# Patient Record
Sex: FEMALE | Race: WHITE | NOT HISPANIC OR LATINO | Employment: FULL TIME | URBAN - METROPOLITAN AREA
[De-identification: names, ages, dates, MRNs, and addresses within clinical notes are randomized per-mention and may not be internally consistent; named-entity substitution may affect disease eponyms.]

---

## 2017-04-27 ENCOUNTER — TRANSFERRED RECORDS (OUTPATIENT)
Dept: HEALTH INFORMATION MANAGEMENT | Facility: CLINIC | Age: 32
End: 2017-04-27

## 2017-04-28 ENCOUNTER — TRANSFERRED RECORDS (OUTPATIENT)
Dept: HEALTH INFORMATION MANAGEMENT | Facility: CLINIC | Age: 32
End: 2017-04-28

## 2017-05-22 ENCOUNTER — PRE VISIT (OUTPATIENT)
Dept: OTOLARYNGOLOGY | Facility: CLINIC | Age: 32
End: 2017-05-22

## 2017-05-22 NOTE — TELEPHONE ENCOUNTER
1.  Date/reason for appt:5/25/17, Ear pain  2.  Referring provider: Self  3.  Call to patient (Yes / No - short description): No, transferred from call center  4.  Previous care at / records requested from:   [facility], [place]   [facility]

## 2017-05-24 DIAGNOSIS — H69.90 DYSFUNCTION OF EUSTACHIAN TUBE, UNSPECIFIED LATERALITY: Primary | ICD-10-CM

## 2017-05-24 NOTE — TELEPHONE ENCOUNTER
Records received from [facility].   Included  Office notes: 4/19/17, 4/26/17, 5/2/17, 5/15/17  ED notes: 4/27/17    Missing/needed records: Dr Goldstein of (images, myringotomy)

## 2017-05-24 NOTE — TELEPHONE ENCOUNTER
Records received from [facility].   Included  Office notes: 4/28/17, 5/9/17, 5/17/17, 5/16/17  Radiology reports: CT ear on 5/9/17    Missing/needed records: op-note on 5/19/17

## 2017-05-25 ENCOUNTER — OFFICE VISIT (OUTPATIENT)
Dept: OTOLARYNGOLOGY | Facility: CLINIC | Age: 32
End: 2017-05-25

## 2017-05-25 ENCOUNTER — OFFICE VISIT (OUTPATIENT)
Dept: AUDIOLOGY | Facility: CLINIC | Age: 32
End: 2017-05-25

## 2017-05-25 DIAGNOSIS — H90.6 MIXED CONDUCTIVE AND SENSORINEURAL HEARING LOSS OF BOTH EARS: Primary | ICD-10-CM

## 2017-05-25 DIAGNOSIS — H69.93 DYSFUNCTION OF EUSTACHIAN TUBE, BILATERAL: ICD-10-CM

## 2017-05-25 DIAGNOSIS — H90.0 CONDUCTIVE HEARING LOSS, BILATERAL: Primary | ICD-10-CM

## 2017-05-25 DIAGNOSIS — M26.609 TMJ (TEMPOROMANDIBULAR JOINT SYNDROME): ICD-10-CM

## 2017-05-25 ASSESSMENT — PAIN SCALES - GENERAL: PAINLEVEL: MILD PAIN (2)

## 2017-05-25 NOTE — PROGRESS NOTES
The patient presents with a history of eustachian tube dysfunction and otalgia. She reports that the difficulty began approximately two months ago with pressure and pain in the ears. The patient was treated for allergies and eustachian tube dysfunction with a serous effusion in the left ear. The patient reports no improvement after a PE tube was placed in the left ear. Her CT scan from [place] is reviewed with her. The patient denies sinusitis, facial pain, nasal obstruction or purulent nasal discharge. She does reports rhinitis related to allergies. She had two previous epistaxis events. The patient denies chronic or recurrent tonsillitis, chronic or recurrent pharyngitis. The patient denies otorrhea, dizziness or tinnitus. Her Audiogram and Tympanogram are reviewed with her and they demonstrate bilateral mild conductive hearing loss worse at the lower frequencies with sensorineural hearing loss in the left ear at higher frequencies. The word recognition scores are 100% on the right and 96% on the left. The left tympanogram is flat consistent with a PE tube and there is mild negative pressure in the right ear.     This patient is seen in consultation as a self referral to my clinic.    All other systems were reviewed and they are either negative or they are not directly pertinent to this Otolaryngology examination.      Past Medical History:    Past Medical History:   Diagnosis Date     Conductive hearing loss 4/23/17     Tinnitus 5/15/17?l       Past Surgical History:    Past Surgical History:   Procedure Laterality Date     PE TUBES  5/19/17    only left ear     TONSILLECTOMY  1988?       Medications:    No current outpatient prescriptions on file.    Allergies:    Erythromycin    Physical Examination:    The patient is a well developed, well nourished female in no apparent distress.  She is normocephalic, atraumatic with pupils equally round and reactive to light.    Oral Cavity Examination:  Normal mucosa with  no masses or lesions  Nasal Examination: Normal mucosa with no masses or lesions  Ear Examination: Ear canals clear, tympanic membranes are sclerotic and middle ear spaces are free of effusions or infection. There is a patent PE tube in the left ear.  Neurological Examination: Facial nerve function intact and symmetric  Integumentary Examination: No lesions on the skin of the head and neck  Neck Examination: No masses or lesions, no lymphadenopathy  Endocrine Examination: Normal thyroid examination  Temporomandibular Joint Examination: Malrotation of the temporomandibular joints bilaterally.  Flexible Fiberoptic Laryngoscopy:  Normal nasopharynx, base of tongue, pyriform sinuses, epiglottis, valleculae, false vocal cords, true vocal cords, and larynx.  Normal motion of the vocal cords with no lesions, masses, nodules, or polyps bilaterally.      Assessment and Plan:    The patient presents with a history of pain in the ears and eustachian tube dysfunction and allergic rhinitis. She will be referred for a repeat CT scan now that the PE tube has been placed to further assess her middle and mastoid. Her otalgia will be further evaluated with the temporomandibular joint disorder clinic of the dental department as she appears to have otalgia secondary to temporomandibular joint disorder. She will be seen again after this CT scan is completed.     CC: Dr. Cheryl Goldstein

## 2017-05-25 NOTE — PATIENT INSTRUCTIONS
The patient presents with a history of pain in the ears and eustachian tube dysfunction and allergic rhinitis. She will be referred for a repeat CT scan now that the PE tube has been placed to further assess her middle and mastoid. Her otalgia will be further evaluated with the temporomandibular joint disorder clinic of the dental department as she appears to have otalgia secondary to temporomandibular joint disorder. She will be seen again after this CT scan is completed.     Patient schedule at [facility] on June 7, 2017 at 2:40 PM.

## 2017-05-25 NOTE — PROGRESS NOTES
AUDIOLOGY REPORT    SUMMARY: Audiology visit completed. See audiogram for results.      RECOMMENDATIONS: Follow-up with ENT.    Yessica Beyer.  Licensed Audiologist  [place] #[id number]

## 2017-05-25 NOTE — MR AVS SNAPSHOT
After Visit Summary   2017    Jerardo Diamond    MRN: [id number]           Patient Information     Date Of Birth          1985        Visit Information        Provider Department      2017 7:30 AM Stacie Parsons AuD M [facility]        Today's Diagnoses     Conductive hearing loss, bilateral    -  1       Follow-ups after your visit        Who to contact     Please call your clinic at [phone] to:    Ask questions about your health    Make or cancel appointments    Discuss your medicines    Learn about your test results    Speak to your doctor   If you have compliments or concerns about an experience at your clinic, or if you wish to file a complaint, please contact [facility] Patient Relations at [phone] or email us at [email or web address]         Additional Information About Your Visit        MyChart Information     Mobileum is an electronic gateway that provides easy, online access to your medical records. With Mobileum, you can request a clinic appointment, read your test results, renew a prescription or communicate with your care team.     To sign up for [email or web address] visit the website at [email or web address]   You will be asked to enter the access code listed below, as well as some personal information. Please follow the directions to create your username and password.     Your access code is: [email or web address]  Expires: 2017  6:30 AM     Your access code will  in 90 days. If you need help or a new code, please contact your [facility] Clinic or call [phone] for assistance.        Care EveryWhere ID     This is your Care EveryWhere ID. This could be used by other organizations to access your [place] medical records  [id number]         Blood Pressure from Last 3 Encounters:   No data found for BP    Weight from Last 3 Encounters:   No data found for Wt              We Performed the  Following     AUDIOGRAM/TYMPANOGRAM - INTERFACE     [facility] Thr[facility]ld Eval & Speech Recog (12382)     Reduced 52 - Tymps / Reflex   (48878)        Primary Care Provider Office Phone # Fax #    Jennifer CASTAÑEDA Zaydagiselle [phone]       [facility] [address]  [facility] [address]        Thank you!     Thank you for choosing [facility]  for your care. Our goal is always to provide you with excellent care. Hearing back from our patients is one way we can continue to improve our services. Please take a few minutes to complete the written survey that you may receive in the mail after your visit with us. Thank you!             Your Updated Medication List - Protect others around you: Learn how to safely use, store and throw away your medicines at www.disposemymeds.org.      Notice  As of 5/25/2017  8:26 AM    You have not been prescribed any medications.

## 2017-05-25 NOTE — MR AVS SNAPSHOT
After Visit Summary   5/25/2017    Jerardo Diamond    MRN: [id number]           Patient Information     Date Of Birth          1985        Visit Information        Provider Department      5/25/2017 8:00 AM Tex Dangelo MD [facility] Ear Nose and Thro[facility]        Today's Diagnoses     Mixed conductive and sensorineural hearing loss of both ears    -  1    TMJ (temporomandibular joint syndrome)        Dysfunction of eustachian tube, bilateral          Care Instructions    The patient presents with a history of pain in the ears and eustachian tube dysfunction and allergic rhinitis. She will be referred for a repeat CT scan now that the PE tube has been placed to further assess her middle and mastoid. Her otalgia will be further evaluated with the temporomandibular joint disorder clinic of the dental department as she appears to have otalgia secondary to temporomandibular joint disorder. She will be seen again after this CT scan is completed.     Called the phone number back of the [employer] card the find out the in-network for TMJ clinic.  Call Jose to fax the referral .           Follow-ups after your visit        Additional Services     DENTAL REFERRAL       Consult for temporomandibular joint disorder evaluation                  Your next 10 appointments already scheduled     May 30, 2017  7:20 AM CDT   CT TEMPORAL ORBITAL SELLA W/O CONTRAST with UCCT1   [facility] CT ([facility])    [address] [address] [phone]           Please bring any scans or X-rays taken at other hospitals, if similar tests were done. Also bring a list of your medicines, including vitamins, minerals and over-the-counter drugs. It is safest to leave personal items at home.  Be sure to tell your doctor:   If you have any allergies.   If there s any chance you are pregnant.   If you are breastfeeding.   If you have any  special needs.  You do not need to do anything special to prepare.  Please wear loose clothing, such as a sweat suit or jogging clothes. Avoid snaps, zippers and other metal. We may ask you to undress and put on a hospital gown.            2017  8:30 AM CDT   (Arrive by 8:15 AM)   Return Visit with Tex Dangelo MD   [facility] Ear Nose and Throat ([facility])    [address] [facility]  [address] [facility] [address] [phone]              Future tests that were ordered for you today     Open Future Orders        Priority Expected Expires Ordered    CT Temporal orbital sella w/o contrast Routine  2018            Who to contact     Please call your clinic at [phone] to:    Ask questions about your health    Make or cancel appointments    Discuss your medicines    Learn about your test results    Speak to your doctor   If you have compliments or concerns about an experience at your clinic, or if you wish to file a complaint, please contact [facility] Patient Relations at [phone] or email us at [email or web address]         Additional Information About Your Visit        Black Duck Softwarehart Information     SchoolMintt is an electronic gateway that provides easy, online access to your medical records. With GEOLID, you can request a clinic appointment, read your test results, renew a prescription or communicate with your care team.     To sign up for SchoolMintt visit the website at [email or web address]   You will be asked to enter the access code listed below, as well as some personal information. Please follow the directions to create your username and password.     Your access code is: [email or web address]  Expires: 2017  6:30 AM     Your access code will  in 90 days. If you need help or a new code, please contact your [facility] Clinic or call [phone] for assistance.        Care  EveryWhere ID     This is your Care EveryWhere ID. This could be used by other organizations to access your [place] medical records  [id number]         Blood Pressure from Last 3 Encounters:   No data found for BP    Weight from Last 3 Encounters:   No data found for Wt              We Performed the Following     DENTAL REFERRAL     LARYNGOSCOPY FLEX FIBEROPTIC, DIAGNOSTIC        Primary Care Provider Office Phone # Fax #    Jennifer Jones [phone]       [place] [address] [address] [facility] [address]        Thank you!     Thank you for choosing [facility] EAR NOSE AND THROAT  for your care. Our goal is always to provide you with excellent care. Hearing back from our patients is one way we can continue to improve our services. Please take a few minutes to complete the written survey that you may receive in the mail after your visit with us. Thank you!             Your Updated Medication List - Protect others around you: Learn how to safely use, store and throw away your medicines at www.disposemymeds.org.      Notice  As of 5/25/2017  9:38 AM    You have not been prescribed any medications.

## 2017-05-25 NOTE — LETTER
5/25/2017       RE: Jerardo Diamond  [address] [address]     Dear Colleague,    Thank you for referring your patient, Jerardo Diamond, to the [facility] EAR NOSE AND THROAT at [facility]. Please see a copy of my visit note below.    The patient presents with a history of eustachian tube dysfunction and otalgia. She reports that the difficulty began approximately two months ago with pressure and pain in the ears. The patient was treated for allergies and eustachian tube dysfunction with a serous effusion in the left ear. The patient reports no improvement after a PE tube was placed in the left ear. Her CT scan from [place] is reviewed with her. The patient denies sinusitis, facial pain, nasal obstruction or purulent nasal discharge. She does reports rhinitis related to allergies. She had two previous epistaxis events. The patient denies chronic or recurrent tonsillitis, chronic or recurrent pharyngitis. The patient denies otorrhea, dizziness or tinnitus. Her Audiogram and Tympanogram are reviewed with her and they demonstrate bilateral mild conductive hearing loss worse at the lower frequencies with sensorineural hearing loss in the left ear at higher frequencies. The word recognition scores are 100% on the right and 96% on the left. The left tympanogram is flat consistent with a PE tube and there is mild negative pressure in the right ear.     This patient is seen in consultation as a self referral to my clinic.    All other systems were reviewed and they are either negative or they are not directly pertinent to this Otolaryngology examination.      Past Medical History:    Past Medical History:   Diagnosis Date     Conductive hearing loss 4/23/17     Tinnitus 5/15/17?l       Past Surgical History:    Past Surgical History:   Procedure Laterality Date     PE TUBES  5/19/17    only left ear     TONSILLECTOMY  1988?       Medications:    No current outpatient  prescriptions on file.    Allergies:    Erythromycin    Physical Examination:    The patient is a well developed, well nourished female in no apparent distress.  She is normocephalic, atraumatic with pupils equally round and reactive to light.    Oral Cavity Examination:  Normal mucosa with no masses or lesions  Nasal Examination: Normal mucosa with no masses or lesions  Ear Examination: Ear canals clear, tympanic membranes are sclerotic and middle ear spaces are free of effusions or infection. There is a patent PE tube in the left ear.  Neurological Examination: Facial nerve function intact and symmetric  Integumentary Examination: No lesions on the skin of the head and neck  Neck Examination: No masses or lesions, no lymphadenopathy  Endocrine Examination: Normal thyroid examination  Temporomandibular Joint Examination: Malrotation of the temporomandibular joints bilaterally.  Flexible Fiberoptic Laryngoscopy:  Normal nasopharynx, base of tongue, pyriform sinuses, epiglottis, valleculae, false vocal cords, true vocal cords, and larynx.  Normal motion of the vocal cords with no lesions, masses, nodules, or polyps bilaterally.      Assessment and Plan:    The patient presents with a history of pain in the ears and eustachian tube dysfunction and allergic rhinitis. She will be referred for a repeat CT scan now that the PE tube has been placed to further assess her middle and mastoid. Her otalgia will be further evaluated with the temporomandibular joint disorder clinic of the dental department as she appears to have otalgia secondary to temporomandibular joint disorder. She will be seen again after this CT scan is completed.     CC: Dr. Cheryl Goldstein      Again, thank you for allowing me to participate in the care of your patient.      Sincerely,    Tex Dangelo MD

## 2017-06-01 ENCOUNTER — OFFICE VISIT (OUTPATIENT)
Dept: OTOLARYNGOLOGY | Facility: CLINIC | Age: 32
End: 2017-06-01

## 2017-06-01 DIAGNOSIS — H90.6 MIXED CONDUCTIVE AND SENSORINEURAL HEARING LOSS OF BOTH EARS: Primary | ICD-10-CM

## 2017-06-01 ASSESSMENT — PAIN SCALES - GENERAL: PAINLEVEL: NO PAIN (1)

## 2017-06-01 NOTE — PATIENT INSTRUCTIONS
The patient presents with a history of pain in the ears and eustachian tube dysfunction and allergic rhinitis. Based upon her CT scan, the patient will be referred for a neurotology evaluation. She is scheduled for an allergy evaluation. Her otalgia will be further evaluated with the temporomandibular joint disorder clinic of the dental department as she appears to have otalgia secondary to temporomandibular joint disorder.

## 2017-06-01 NOTE — PROGRESS NOTES
The patient presents with a history of eustachian tube dysfunction and otalgia. She reports that the difficulty began approximately two months ago with pressure and pain in the ears. The patient was treated for allergies and eustachian tube dysfunction with a serous effusion in the left ear. The patient reports no improvement after a PE tube was placed in the left ear. Her CT scan from [place] is reviewed with her and the repeat CT scan is reviewed with her. There is some debris in the epitympanum.  She does reports rhinitis related to allergies. She had two previous epistaxis events.The patient denies otorrhea, dizziness or tinnitus. Her Audiogram and Tympanogram are reviewed with her and they demonstrate bilateral mild conductive hearing loss worse at the lower frequencies with sensorineural hearing loss in the left ear at higher frequencies. The word recognition scores are 100% on the right and 96% on the left. The left tympanogram is flat consistent with a PE tube and there is mild negative pressure in the right ear.       All other systems were reviewed and they are either negative or they are not directly pertinent to this Otolaryngology examination.      Past Medical History:    Past Medical History:   Diagnosis Date     Conductive hearing loss 4/23/17     Tinnitus 5/15/17?l       Past Surgical History:    Past Surgical History:   Procedure Laterality Date     PE TUBES  5/19/17    only left ear     TONSILLECTOMY  1988?       Medications:      Current Outpatient Prescriptions:      Cetirizine HCl (ZYRTEC ALLERGY PO), Take 10 mg by mouth, Disp: , Rfl:      Montelukast Sodium (SINGULAIR PO), , Disp: , Rfl:      Ibuprofen (ADVIL PO), Take 600 mg by mouth, Disp: , Rfl:     Allergies:    Erythromycin    Physical Examination:    The patient is a well developed, well nourished female in no apparent distress.  She is normocephalic, atraumatic with pupils equally round and reactive to light.    Oral Cavity Examination:   Normal mucosa with no masses or lesions  Nasal Examination: Normal mucosa with no masses or lesions  Ear Examination: Ear canals clear, tympanic membranes are sclerotic and middle ear spaces are free of effusions or infection. There is a patent PE tube in the left ear.  Neurological Examination: Facial nerve function intact and symmetric  Integumentary Examination: No lesions on the skin of the head and neck  Temporomandibular Joint Examination: Malrotation of the temporomandibular joints bilaterally.    Assessment and Plan:    The patient presents with a history of pain in the ears and eustachian tube dysfunction and allergic rhinitis. Based upon her CT scan, the patient will be referred for a neurotology evaluation. She is scheduled for an allergy evaluation. Her otalgia will be further evaluated with the temporomandibular joint disorder clinic of the dental department as she appears to have otalgia secondary to temporomandibular joint disorder.    CC: Dr. Cheryl Goldstein

## 2017-06-01 NOTE — LETTER
6/1/2017       RE: Jerardo Diamond  [address] [facility] [address]     Dear Colleague,    Thank you for referring your patient, Jerardo Diamond, to the [facility] EAR NOSE AND THROAT at [facility]. Please see a copy of my visit note below.    The patient presents with a history of eustachian tube dysfunction and otalgia. She reports that the difficulty began approximately two months ago with pressure and pain in the ears. The patient was treated for allergies and eustachian tube dysfunction with a serous effusion in the left ear. The patient reports no improvement after a PE tube was placed in the left ear. Her CT scan from [place] is reviewed with her and the repeat CT scan is reviewed with her. There is some debris in the epitympanum.  She does reports rhinitis related to allergies. She had two previous epistaxis events.The patient denies otorrhea, dizziness or tinnitus. Her Audiogram and Tympanogram are reviewed with her and they demonstrate bilateral mild conductive hearing loss worse at the lower frequencies with sensorineural hearing loss in the left ear at higher frequencies. The word recognition scores are 100% on the right and 96% on the left. The left tympanogram is flat consistent with a PE tube and there is mild negative pressure in the right ear.       All other systems were reviewed and they are either negative or they are not directly pertinent to this Otolaryngology examination.      Past Medical History:    Past Medical History:   Diagnosis Date     Conductive hearing loss 4/23/17     Tinnitus 5/15/17?l       Past Surgical History:    Past Surgical History:   Procedure Laterality Date     PE TUBES  5/19/17    only left ear     TONSILLECTOMY  1988?       Medications:      Current Outpatient Prescriptions:      Cetirizine HCl (ZYRTEC ALLERGY PO), Take 10 mg by mouth, Disp: , Rfl:      Montelukast Sodium (SINGULAIR PO), , Disp: , Rfl:      Ibuprofen (ADVIL PO), Take  600 mg by mouth, Disp: , Rfl:     Allergies:    Erythromycin    Physical Examination:    The patient is a well developed, well nourished female in no apparent distress.  She is normocephalic, atraumatic with pupils equally round and reactive to light.    Oral Cavity Examination:  Normal mucosa with no masses or lesions  Nasal Examination: Normal mucosa with no masses or lesions  Ear Examination: Ear canals clear, tympanic membranes are sclerotic and middle ear spaces are free of effusions or infection. There is a patent PE tube in the left ear.  Neurological Examination: Facial nerve function intact and symmetric  Integumentary Examination: No lesions on the skin of the head and neck  Temporomandibular Joint Examination: Malrotation of the temporomandibular joints bilaterally.    Assessment and Plan:    The patient presents with a history of pain in the ears and eustachian tube dysfunction and allergic rhinitis. Based upon her CT scan, the patient will be referred for a neurotology evaluation. She is scheduled for an allergy evaluation. Her otalgia will be further evaluated with the temporomandibular joint disorder clinic of the dental department as she appears to have otalgia secondary to temporomandibular joint disorder.    CC: Dr. Cheryl Goldstein      Sincerely,    Tex Dangelo MD

## 2017-06-01 NOTE — MR AVS SNAPSHOT
After Visit Summary   6/1/2017    Jerardo Diamond    MRN: [id number]           Patient Information     Date Of Birth          1985        Visit Information        Provider Department      6/1/2017 8:30 AM Tex Dangelo MD [facility] Ear Nose and Throat        Today's Diagnoses     Mixed conductive and sensorineural hearing loss of both ears    -  1      Care Instructions    The patient presents with a history of pain in the ears and eustachian tube dysfunction and allergic rhinitis. Based upon her CT scan, the patient will be referred for a neurotology evaluation. She is scheduled for an allergy evaluation. Her otalgia will be further evaluated with the temporomandibular joint disorder clinic of the dental department as she appears to have otalgia secondary to temporomandibular joint disorder.          Follow-ups after your visit        Your next 10 appointments already scheduled     Jun 07, 2017 12:00 PM CDT   (Arrive by 11:45 AM)   NEW NEUROTOLOGY VISIT with MD MADDY Guzman [facility] Nose and Throat ([facility])    [address] [place] [address] [phone]              Who to contact     Please call your clinic at [phone] to:    Ask questions about your health    Make or cancel appointments    Discuss your medicines    Learn about your test results    Speak to your doctor   If you have compliments or concerns about an experience at your clinic, or if you wish to file a complaint, please contact [facility] Patient Relations at [phone] or email us at [email or web address]         Additional Information About Your Visit        MyChart Information     SOMNIUMÂ® Technologiest gives you secure access to your electronic health record. If you see a primary care provider, you can also send messages to your care team and make appointments. If you have questions, please call your primary care clinic.   If you do not have a primary care provider, please call [phone] and they will assist you.      ManageSocial is an electronic gateway that provides easy, online access to your medical records. With ManageSocial, you can request a clinic appointment, read your test results, renew a prescription or communicate with your care team.     To access your existing account, please contact your [facility] or call [phone] for assistance.        Care EveryWhere ID     This is your Care EveryWhere ID. This could be used by other organizations to access your [place] medical records  [id number]         Blood Pressure from Last 3 Encounters:   No data found for BP    Weight from Last 3 Encounters:   No data found for Wt              Today, you had the following     No orders found for display       Primary Care Provider Office Phone # Fax #    Jennifer MADDY Zaydagiselle [phone]       [place] [address] [address] [facility] [address]        Thank you!     Thank you for choosing [facility] EAR NOSE AND THROAT  for your care. Our goal is always to provide you with excellent care. Hearing back from our patients is one way we can continue to improve our services. Please take a few minutes to complete the written survey that you may receive in the mail after your visit with us. Thank you!             Your Updated Medication List - Protect others around you: Learn how to safely use, store and throw away your medicines at www.disposemymeds.org.          This list is accurate as of: 6/1/17  9:04 AM.  Always use your most recent med list.                   Brand Name Dispense Instructions for use    ADVIL PO      Take 600 mg by mouth       SINGULAIR PO          ZYRTEC ALLERGY PO      Take 10 mg by mouth

## 2017-06-07 ENCOUNTER — OFFICE VISIT (OUTPATIENT)
Dept: OTOLARYNGOLOGY | Facility: CLINIC | Age: 32
End: 2017-06-07

## 2017-06-07 ENCOUNTER — TRANSFERRED RECORDS (OUTPATIENT)
Dept: HEALTH INFORMATION MANAGEMENT | Facility: CLINIC | Age: 32
End: 2017-06-07

## 2017-06-07 VITALS — WEIGHT: 213 LBS | HEIGHT: 70 IN | BODY MASS INDEX: 30.49 KG/M2

## 2017-06-07 DIAGNOSIS — H93.8X2 EAR FULLNESS, LEFT: ICD-10-CM

## 2017-06-07 DIAGNOSIS — H90.0 CONDUCTIVE HEARING LOSS, BILATERAL: Primary | ICD-10-CM

## 2017-06-07 ASSESSMENT — PAIN SCALES - GENERAL: PAINLEVEL: NO PAIN (0)

## 2017-06-07 NOTE — LETTER
6/7/2017       RE: Jerardo Diamond  [address] [place] [facility] [address]     Dear Colleague,    Thank you for referring your patient, Jerardo Diamond, to the [facility] EAR NOSE AND THROAT at [facility]. Please see a copy of my visit note below.    Jerardo Diamond is seen in consultation from Dr. Dangelo.  She is a 32 year old female being seen for primarily left aural fullness and hearing loss.  She reports her symptoms started in April and that she was seen at her local clinic and was diagnosed with a left effusion.  She said she had a throbbing sensation in her ear at that time as well.  She had a myringotomy done and was told a lot of fluid was suctioned from her ear.  The throbbing sensation went away but the fullness and hearing loss persisted.  She was seen again and a PE tube was attempted but was unable to be placed in clinic due to the shape of her ear canal so it was placed in the operating room.  However, no effusion was noted and her symptoms did not improve.  She does not think her hearing has worsened since this started.  She has not had any drainage from the PE tube.  She has some mild fullness on the right as well.  No otalgia or otorrhea on the right, maybe some hearing loss.    Past Medical History:   Diagnosis Date     Conductive hearing loss 4/23/17     Tinnitus 5/15/17?l       Past Surgical History:   Procedure Laterality Date     PE TUBES  5/19/17    only left ear     TONSILLECTOMY  1988?       Family History   Problem Relation Age of Onset     Dementia Maternal Grandmother      alzheimers     CANCER Maternal Grandfather      Throat - Tonsil?     CANCER Paternal Grandmother      b-cell?     Hypertension Mother      mild        Social History   Substance Use Topics     Smoking status: Former Smoker     Packs/day: 0.00     Years: 3.00     Types: Cigarettes     Start date: 1/1/2003     Quit date: 1/1/2005     Smokeless tobacco: Never Used     Alcohol use Yes        Patient Supplied Answers to Review of Systems   ENT ROS 6/7/2017   Constitutional Unexplained fatigue, Unexplained fever or night sweats   Neurology Headache   Ears, Nose, Throat Hearing loss, Ear pain, Ringing/noise in ears, Nasal congestion or drainage, Sore throat   Cardiopulmonary -   Musculoskeletal Sore or stiff joints   Allergy/Immunology Allergies or hay fever   Endocrine Thirst, Frequent urination   The remainder of the 10 point review of systems is otherwise negative.    Physical examination:  Constitutional:  In no acute distress, appears stated age  Eyes:  Extraocular movements intact, no spontaneous nystagmus  Ears:  Both ears examined under the microscope.  Right TM intact with an aerated middle ear, no retractions noted.  Left TM with a PE tube in the posterior inferior quadrant with quite a bit of dried crusting on the canal as well as around the tube and possibly obstructing the tube, middle ear appears clear, no retractions noted.  Epstein to the left on both the forehead and teeth.  Rinne shows air greater than bone on the left.  Respiratory:  No increased work of breathing, wheezing or stridor  Musculoskeletal:  Good upper extremity strength  Skin:  No rashes on the head and neck  Neurologic:  House Brackman 1/6 bilaterally, ambulating normally  Psychiatric:  Alert, normal affect, answering questions appropriately    Audiogram:  Audiogram from 5/25 was reviewed and it showed a right mild upsloping to normal downsloping to normal/mild conductive hearing loss with 100% speech discrimination.  Left normal/mild upsloping to normal downsloping to mild/moderate conductive hearing loss with 96% speech discrimination.  Right normal tympanogram and left high volume flat tympanogram.  Absent right ipsilateral and contralateral reflexes.    CT:  CT temporal bones was reviewed and it shows fluid or soft tissue around the ossicular chain on the left without clear erosion although the long process of the  incus is not well visualized.  However, the PE tube is adjacent to the opacified area.  Mastoid is well developed and aerated.  The right middle ear and mastoid are well developed and aerated with no anomalies noted.    Assessment and plan:  Left aural fullness and hearing loss with no improvement despite myringotomy and subsequent PE tube placement.  She was very surprised to learn that her right hearing was worse than her left when tested last month.  However, her tuning fork testing today suggests that her left conductive loss is worse than the right.  We tried to obtain an audiogram today but she was unable to stay so will come back tomorrow for an audiogram.    We discussed that her fullness were unlikely secondary to eustachian tube dysfunction as they did not improve with myringotomy or PE tube placement and both of these are bypassing the eustachian tube altogether.  It is difficult to know if the opacification around the ossicular chain on the left is fluid, insippated mucus or tissue.  The PE tube is essentially adjacent to the ossicular chain and may be causing the opacification.    Her hearing loss on the right with absent reflexes is suspicious for otosclerosis.  Unfortunately, reflexes could not be done on the left as she had an open PE tube.  We discussed that if her new audiogram showed a significant worsening of the left conductive hearing loss then exploratory tympanotomy with removal of the PE tube would be indicated.  She understands that we do not know what the opacification is based on CT imaging.  She had questions regarding middle ear exploration even if the left hearing is relatively stable.  The risks and benefits were discussed of left tympanoplasty with removal of PE tube and middle ear exploration.  The risks include but are not limited to:  Worsened hearing which may require further surgery, profound and irreversible hearing loss, dizziness, damage to the taste nerve, damage to the  facial nerve, tympanic membrane perforation requiring further surgery and infection.  Postoperative restrictions were discussed.  If cholesteatoma was identified, the need for revision surgery in 6 months was discussed as well as the possibility that her hearing may be worse in the intervening period if ossicles required removal.      She had her questions answered and we will contact her with the audiogram results.    Sincerely,    Leslie Clayton MD

## 2017-06-07 NOTE — MR AVS SNAPSHOT
After Visit Summary   6/7/2017    Jerardo Diamond    MRN: [id number]           Patient Information     Date Of Birth          1985        Visit Information        Provider Department      6/7/2017 12:00 PM Leslie Clayton MD [facility] Ear Nose and Throat        Today's Diagnoses     Hearing loss    -  1      Care Instructions     You will have a hearing test done and will be called with the results.    Patient to review pre operative information.   Patient to schedule a pre-op physical with their primary MD or with our Preoperative Assessment Clinic within 30 days of the procedure.    Patient to avoid blood thinning medications 1 week prior to surgery (Ibuprofen, Aleve, Aspirin, fish oil )   Use the antiseptic scrub as directed.   You will need to schedule a post operative appointment for 3 weeks after surgery    Patient to call the ENT clinic with further questions or concerns:   ENT Triage Nurse  [phone] option 3  ENT appointment scheduling [phone] option 1  ENT surgery scheduling, Kasey [phone]  ENT Nurse Yumiko [phone]          Follow-ups after your visit        Additional Services     AUDIOLOGY ADULT REFERRAL       Your provider has referred you to: [facility] [phone]   Fax [phone]    Treatment:  Evaluation & Treatment  Specialty Testing:  Audiogram w/Tymps and Reflexes    Please be aware that coverage of these services is subject to the terms and limitations of your health insurance plan.  Call member services at your health plan with any benefit or coverage questions.      Please bring the following to your appointment:  >>   Any x-rays, CTs or MRIs which have been performed.  Contact the facility where they were done to arrange for  prior to your scheduled appointment.  Any new CT, MRI or other procedures ordered by your specialist must be performed at a [facility] or coordinated by your clinic's referral  office.   >>   List of current medications  >>   This referral request   >>   Any documents/labs given to you for this referral                  Your next 10 appointments already scheduled     Jun 08, 2017 10:00 AM CDT   (Arrive by 9:45 AM)   Hearing Evaluation with Susan Hurt   [facility] ([facility])    [address]  [address] [facility] [id number] [phone]           Please see your medical professional for ear cleaning prior to this appointment if you believe wax buildup may be an issue. All patients are required to have a physician's order stating the medical reason for the hearing test. Your doctor can send an electronic order, use their own form or we have provided a form (called Physician's Order for Audiology Services). It states that there is a medical reason for your exam. Without an order you may need to be rescheduled until the order can be obtained.              Future tests that were ordered for you today     Open Future Orders        Priority Expected Expires Ordered    AUDIOLOGY ADULT REFERRAL Routine  12/4/2017 6/7/2017            Who to contact     Please call your clinic at [phone] to:    Ask questions about your health    Make or cancel appointments    Discuss your medicines    Learn about your test results    Speak to your doctor   If you have compliments or concerns about an experience at your clinic, or if you wish to file a complaint, please contact [facility] Patient Relations at [phone] or email us at [email or web address] [email or web address]         Additional Information About Your Visit        Fortscalehart Information     Ouroboros gives you secure access to your electronic health record. If you see a primary care provider, you can also send messages to your care team and make appointments. If you have questions, please call your primary care clinic.  If you do not have a primary care provider, please  "call [phone] and they will assist you.      Lukkin is an electronic gateway that provides easy, online access to your medical records. With Lukkin, you can request a clinic appointment, read your test results, renew a prescription or communicate with your care team.     To access your existing account, please contact your [facility] or call [phone] for assistance.        Care EveryWhere ID     This is your Care EveryWhere ID. This could be used by other organizations to access your [place] medical records  [id number]        Your Vitals Were     Height BMI (Body Mass Index)                1.79 m (5' 10.47\") 30.15 kg/m2           Blood Pressure from Last 3 Encounters:   No data found for BP    Weight from Last 3 Encounters:   06/07/17 96.6 kg (213 lb)               Primary Care Provider Office Phone # Fax #    Jennifer CASTAÑEDA TeDanielle [phone]       [place] [address] [address] [facility] [address]        Thank you!     Thank you for choosing [facility] EAR NOSE AND THROAT  for your care. Our goal is always to provide you with excellent care. Hearing back from our patients is one way we can continue to improve our services. Please take a few minutes to complete the written survey that you may receive in the mail after your visit with us. Thank you!             Your Updated Medication List - Protect others around you: Learn how to safely use, store and throw away your medicines at www.disposemymeds.org.          This list is accurate as of: 6/7/17  1:22 PM.  Always use your most recent med list.                   Brand Name Dispense Instructions for use    ADVIL PO      Take 600 mg by mouth       SINGULAIR PO          ZYRTEC ALLERGY PO      Take 10 mg by mouth         "

## 2017-06-07 NOTE — NURSING NOTE
Chief Complaint   Patient presents with     Consult     mixed hearing loss     Antwon Arellano LPN

## 2017-06-07 NOTE — PATIENT INSTRUCTIONS
You will have a hearing test done and will be called with the results.    Patient to review pre operative information.   Patient to schedule a pre-op physical with their primary MD or with our Preoperative Assessment Clinic within 30 days of the procedure.    Patient to avoid blood thinning medications 1 week prior to surgery (Ibuprofen, Aleve, Aspirin, fish oil )   Use the antiseptic scrub as directed.   You will need to schedule a post operative appointment for 3 weeks after surgery    Patient to call the ENT clinic with further questions or concerns:   ENT Triage Nurse  [phone] option 3  ENT appointment scheduling [phone] option 1  ENT surgery scheduling, K[place]y [phone]  ENT Nurse Yumiko [phone]

## 2017-06-07 NOTE — NURSING NOTE
Pt will have audio done- Dr. Clayton will review and pt will be called with surgical plan- Possible: left tympanoplasty; left exploratory tympanotomy removal of PE tube.  Pt was given the surgical packet. Will call pt with pre op teaching.

## 2017-06-08 ENCOUNTER — OFFICE VISIT (OUTPATIENT)
Dept: AUDIOLOGY | Facility: CLINIC | Age: 32
End: 2017-06-08

## 2017-06-08 DIAGNOSIS — H90.0 CONDUCTIVE HEARING LOSS, BILATERAL: Primary | ICD-10-CM

## 2017-06-08 NOTE — MR AVS SNAPSHOT
After Visit Summary   6/8/2017    Jerardo Diamond    MRN: [id number]           Patient Information     Date Of Birth          1985        Visit Information        Provider Department      6/8/2017 10:00 AM Stacie Parsons AuD M [facility] Audiology        Today's Diagnoses     Conductive hearing loss, bilateral    -  1       Follow-ups after your visit        Who to contact     Please call your clinic at [phone] to:    Ask questions about your health    Make or cancel appointments    Discuss your medicines    Learn about your test results    Speak to your doctor   If you have compliments or concerns about an experience at your clinic, or if you wish to file a complaint, please contact [facility] Patient Relations at [phone] or email us at [email or web address]         Additional Information About Your Visit        MyChart Information     Music Unitedt gives you secure access to your electronic health record. If you see a primary care provider, you can also send messages to your care team and make appointments. If you have questions, please call your primary care clinic.  If you do not have a primary care provider, please call [phone] and they will assist you.      eTelemetry is an electronic gateway that provides easy, online access to your medical records. With eTelemetry, you can request a clinic appointment, read your test results, renew a prescription or communicate with your care team.     To access your existing account, please contact your [facility] or call [phone] for assistance.        Care EveryWhere ID     This is your Care EveryWhere ID. This could be used by other organizations to access your [place] medical records  [id number]         Blood Pressure from Last 3 Encounters:   No data found for BP    Weight from Last 3 Encounters:   06/07/17 96.6 kg (213 lb)              We Performed the Following      AUDIOGRAM/TYMPANOGRAM - INTERFACE     AUDIOLOGY ADULT REFERRAL     [facility] Eval & Speech Recog ([address])     Tymps / Reflex   ([address])        Primary Care Provider Office Phone # Fax #    Jennifer Jones [phone]       [facility] [address]  [place] [address]        Thank you!     Thank you for choosing [facility] AUDIOLOGY  for your care. Our goal is always to provide you with excellent care. Hearing back from our patients is one way we can continue to improve our services. Please take a few minutes to complete the written survey that you may receive in the mail after your visit with us. Thank you!             Your Updated Medication List - Protect others around you: Learn how to safely use, store and throw away your medicines at www.disposemymeds.org.          This list is accurate as of: 6/8/17 10:30 AM.  Always use your most recent med list.                   Brand Name Dispense Instructions for use    ADVIL PO      Take 600 mg by mouth       SINGULAIR PO          ZYRTEC ALLERGY PO      Take 10 mg by mouth

## 2017-06-08 NOTE — PROGRESS NOTES
AUDIOLOGY REPORT    SUMMARY: Audiology visit completed. See audiogram for results.      RECOMMENDATIONS: Follow-up with ENT.    Yessica Beyer.  Licensed Audiologist  [place] #[id number]

## 2017-06-09 ENCOUNTER — CARE COORDINATION (OUTPATIENT)
Dept: OTOLARYNGOLOGY | Facility: CLINIC | Age: 32
End: 2017-06-09

## 2017-06-09 NOTE — PROGRESS NOTES
I looked at her audio done today.  Her left hearing is only very slightly worse than it was on 5/25 and her right hearing remains slightly worse than her left.  At this point, it is completely up to her to decide if she would like to proceed with exploration or continue with observation.  Also, her PE tube is now plugged and nonfunctional which is fine as her eardrum is moving appropriately.     Thanks.     Leslie     Actually, first step may actually be to just remove the PE tube in clinic.  Thanks.     Leslie     6-9-17 Pt notified of above and understood- she will rtc to discuss/have PE removed.  rtc 6-13-17 at 10 AM----------- Yumiko Jorge RN  ENT Care Coordinator   Otology  [phone]

## 2017-06-26 ENCOUNTER — OFFICE VISIT (OUTPATIENT)
Dept: OTOLARYNGOLOGY | Facility: CLINIC | Age: 32
End: 2017-06-26

## 2017-06-26 VITALS — HEIGHT: 70 IN | WEIGHT: 213 LBS | BODY MASS INDEX: 30.49 KG/M2

## 2017-06-26 DIAGNOSIS — H90.0 CONDUCTIVE HEARING LOSS, BILATERAL: ICD-10-CM

## 2017-06-26 DIAGNOSIS — H93.8X2 FULLNESS IN EAR, LEFT: Primary | ICD-10-CM

## 2017-06-26 RX ORDER — PENICILLIN V POTASSIUM 250 MG/5ML
SOLUTION, RECONSTITUTED, ORAL ORAL 4 TIMES DAILY
COMMUNITY

## 2017-06-26 ASSESSMENT — PAIN SCALES - GENERAL: PAINLEVEL: MILD PAIN (3)

## 2017-06-26 NOTE — NURSING NOTE
Chief Complaint   Patient presents with     RECHECK     review audio     Tana Lowe Medical Assistant

## 2017-06-26 NOTE — LETTER
6/26/2017      RE: Jerardo Diamond  [address]  [facility] [address]       Jerardo Diamond is seen for continued discussion regarding her left ear fullness and echoing quality to her hearing.  She reports that she was diagnosed with strep throat a week ago and had profuse left otorrhea at the time.  The otorrhea has stopped and her fullness persisted during and after the otorrhea.  She thinks that she can hear something moving in her ear.  Right ear fine.    Review of systems:  Her sore throat is better.  No difficulties swallowing.    Physical examination:  female in no acute distress.  Alert and answering questions appropriately.  HB 1/6 bilaterally.  Left ear examined under the microscope.  Her ear canal remains quite narrow and the PE tube remains in position.  It is quite difficult to see around the tube.    Audiogram:  She had a repeat audiogram 6/8 which showed right mild upsloping to normal primarily conductive hearing loss with 100% speech discrimination, left mild upsloping to normal downsloping to mild/moderate conductive hearing loss with 100% speech discrimination.  Bilateral normal tympanograms.    Assessment and plan:  Continued sense of left aural fullness and conductive hearing loss.  She did not gain any improvement in her symptoms after PE tube placement and reportedly had no effusion at the time of placement.  We went over the CT temporal bones again showing soft tissue or effusion within the mesotympanum surrounding the stapes.  We had another extensive discussion regarding what the opacification might be within the middle ear and that there is likely no way of knowing for certain that it is not cholesteatoma without exploring the ear as she is most concerned regarding that diagnosis as it could continue to grow and worsen her hearing.  We discussed that if it were cholesteatoma, she would likely need a revision surgery in 6 months.  Her hearing may also be worse if ossicles would need removal.   The other risks and benefits were discussed.  The risks include but are not limited to:  profound and irreversible hearing loss, dizziness, damage to the taste nerve, damage to the facial nerve, tympanic membrane perforation requiring further surgery and infection.  Postoperative restrictions were discussed.  She had her questions answered and after our discussion, would like to proceed with left PE tube removal and tympanoplasty with possible cartilage.    Please note that more than 75% of this 25 minute visit were spent in consultation.    Leslie Clayton MD

## 2017-06-26 NOTE — NURSING NOTE
Teaching Flowsheet - ENT   Relevant Diagnosis:  Teaching Topic:left tympanoplasty with PE tube removal  Person(s) involved in teaching:  Patient     Motivation Level:  Asks Questions:   Yes  Eager to Learn:   Yes  Cooperative:   Yes  Receptive (willing/able to accept information):   Yes  Comments: Reviewed pre-op H and P,  NPO prior to  surgery,  pre-op scrub (given Hibiclens)  Reviewed post-op  cares including  ear/wound care, activity and pain.     Patient demonstrates understanding of the following:  Reason for the appointment, diagnosis and treatment plan:   Yes  Knowledge of proper use of medications and conditions for which they are ordered (with special attention to potential side effects or drug interactions):  stop aspirin products 1 week before surgery Yes  Which situations necessitate calling provider and whom to contact:   Yes  Nutritional needs and diet plan:   Yes  Pain management techniques:   Yes  Patient instructed on hand hygiene:  Yes  How and/when to access community resources:   Yes     Infection Prevention:  Patient   demonstrates understanding of the following:  Surgical procedure site care taught Yes  Signs and symptoms of infection taught Yes  Wound care taught Yes  Instructional Materials Used/Given: pre- op booklet, ear surgery  handout and verbal  Instruction.

## 2017-06-26 NOTE — PROGRESS NOTES
Jerardo Diamond is seen for continued discussion regarding her left ear fullness and echoing quality to her hearing.  She reports that she was diagnosed with strep throat a week ago and had profuse left otorrhea at the time.  The otorrhea has stopped and her fullness persisted during and after the otorrhea.  She thinks that she can hear something moving in her ear.  Right ear fine.    Review of systems:  Her sore throat is better.  No difficulties swallowing.    Physical examination:  female in no acute distress.  Alert and answering questions appropriately.  HB 1/6 bilaterally.  Left ear examined under the microscope.  Her ear canal remains quite narrow and the PE tube remains in position.  It is quite difficult to see around the tube.    Audiogram:  She had a repeat audiogram 6/8 which showed right mild upsloping to normal primarily conductive hearing loss with 100% speech discrimination, left mild upsloping to normal downsloping to mild/moderate conductive hearing loss with 100% speech discrimination.  Bilateral normal tympanograms.    Assessment and plan:  Continued sense of left aural fullness and conductive hearing loss.  She did not gain any improvement in her symptoms after PE tube placement and reportedly had no effusion at the time of placement.  We went over the CT temporal bones again showing soft tissue or effusion within the mesotympanum surrounding the stapes.  We had another extensive discussion regarding what the opacification might be within the middle ear and that there is likely no way of knowing for certain that it is not cholesteatoma without exploring the ear as she is most concerned regarding that diagnosis as it could continue to grow and worsen her hearing.  We discussed that if it were cholesteatoma, she would likely need a revision surgery in 6 months.  Her hearing may also be worse if ossicles would need removal.  The other risks and benefits were discussed.  The risks include but are  not limited to:  profound and irreversible hearing loss, dizziness, damage to the taste nerve, damage to the facial nerve, tympanic membrane perforation requiring further surgery and infection.  Postoperative restrictions were discussed.  She had her questions answered and after our discussion, would like to proceed with left PE tube removal and tympanoplasty with possible cartilage.    Please note that more than 75% of this 25 minute visit were spent in consultation.

## 2017-06-26 NOTE — Clinical Note
6/26/2017       RE: Jerardo Diamond  [address]  [facility] [address]     Dear Colleague,    Thank you for referring your patient, Jerardo Daimond, to the [facility] EAR NOSE AND THROAT at [facility]. Please see a copy of my visit note below.    Jerardo Diamond is seen for continued discussion regarding her left ear fullness and echoing quality to her hearing.  She reports that she was diagnosed with strep throat a week ago and had profuse left otorrhea at the time.  The otorrhea has stopped and her fullness persisted during and after the otorrhea.  She thinks that she can     Review of systems:  ***    Physical examination:  {MALE/FEMALE:424439} in no acute distress.  Alert and answering questions appropriately.  HB 1/6 bilaterally.  {RIGHT, LEFT-INITIAL CAP:5607} ears examined under the microscope.    Assessment and plan:  ***      Again, thank you for allowing me to participate in the care of your patient.      Sincerely,    Leslie Clayton MD

## 2017-06-26 NOTE — PATIENT INSTRUCTIONS
Patient to review pre operative information.   Patient to schedule a pre-op physical with their primary MD or with our Preoperative Assessment Clinic within 30 days of the procedure.    Patient to avoid blood thinning medications 1 week prior to surgery (Ibuprofen, Aleve, Aspirin, fish oil )   Use the antiseptic scrub as directed.   You will need to schedule a post operative appointment for 3 weeks after surgery    Patient to call the ENT clinic with further questions or concerns:   ENT Triage Nurse  [phone] option 3  ENT appointment scheduling [phone] option 1  ENT surgery scheduling, Kasey [phone]  ENT Nurse Yumiko [phone]

## 2017-06-26 NOTE — MR AVS SNAPSHOT
After Visit Summary   6/26/2017    Jerardo Diamond    MRN: [id number]           Patient Information     Date Of Birth          1985        Visit Information        Provider Department      6/26/2017 9:45 AM Leslie Clayton MD [facility] Ear Nose and Throat        Care Instructions     Patient to review pre operative information.   Patient to schedule a pre-op physical with their primary MD or with our Preoperative Assessment Clinic within 30 days of the procedure.    Patient to avoid blood thinning medications 1 week prior to surgery (Ibuprofen, Aleve, Aspirin, fish oil )   Use the antiseptic scrub as directed.   You will need to schedule a post operative appointment for 3 weeks after surgery    Patient to call the ENT clinic with further questions or concerns:   ENT Triage Nurse  [phone] option 3  ENT appointment scheduling [phone] option 1  ENT surgery scheduling, Kasey [phone]  ENT Nurse Yumiko [phone]          Follow-ups after your visit        Who to contact     Please call your clinic at [phone] to:    Ask questions about your health    Make or cancel appointments    Discuss your medicines    Learn about your test results    Speak to your doctor   If you have compliments or concerns about an experience at your clinic, or if you wish to file a complaint, please contact [facility] Patient Relations at [phone] or email us at [email or web address]         Additional Information About Your Visit        MyChart Information     [employer] gives you secure access to your electronic health record. If you see a primary care provider, you can also send messages to your care team and make appointments. If you have questions, please call your primary care clinic.  If you do not have a primary care provider, please call [phone] and they will assist you.      [employer] is an electronic gateway that provides easy, online access to your medical  "records. With [employer], you can request a clinic appointment, read your test results, renew a prescription or communicate with your care team.     To access your existing account, please contact your [facility] or call [phone] for assistance.        Care EveryWhere ID     This is your Care EveryWhere ID. This could be used by other organizations to access your [place] medical records  [id number]        Your Vitals Were     Height BMI (Body Mass Index)                1.79 m (5' 10.47\") 30.15 kg/m2           Blood Pressure from Last 3 Encounters:   No data found for BP    Weight from Last 3 Encounters:   06/26/17 96.6 kg (213 lb)   06/07/17 96.6 kg (213 lb)              Today, you had the following     No orders found for display       Primary Care Provider Office Phone # Fax #    Jennifer Jones [phone]       [place] [address] [address] [facility] [address]        Equal Access to Services     ANTON PERLA : Hadii aad ku hadasho Soomaali, waaxda luqadaha, qaybta kaalmada adeegyada, waxay idiin haykirstenn shelby archer . So [facility] [phone].    ATENCIÓN: Si habla español, tiene a tomlinson disposición servicios gratuitos de asistencia lingüística. Llame al [phone].    We comply with applicable federal civil rights laws and [place] laws. We do not discriminate on the basis of race, color, national origin, age, disability sex, sexual orientation or gender identity.            Thank you!     Thank you for choosing [facility] EAR NOSE AND THROAT  for your care. Our goal is always to provide you with excellent care. Hearing back from our patients is one way we can continue to improve our services. Please take a few minutes to complete the written survey that you may receive in the mail after your visit with us. Thank you!             Your Updated Medication List - Protect others around you: Learn how to safely use, store and throw away your medicines at " www.disposemymeds.org.          This list is accurate as of: 6/26/17 10:28 AM.  Always use your most recent med list.                   Brand Name Dispense Instructions for use Diagnosis    ADVIL PO      Take 600 mg by mouth        penicillin V 250 mg/5 mL suspension    VEETID     Take by mouth 4 times daily        SINGULAIR PO           ZYRTEC ALLERGY PO      Take 10 mg by mouth

## 2017-07-15 ENCOUNTER — HEALTH MAINTENANCE LETTER (OUTPATIENT)
Age: 32
End: 2017-07-15

## 2019-11-03 ENCOUNTER — HEALTH MAINTENANCE LETTER (OUTPATIENT)
Age: 34
End: 2019-11-03

## 2020-11-16 ENCOUNTER — HEALTH MAINTENANCE LETTER (OUTPATIENT)
Age: 35
End: 2020-11-16

## 2021-09-18 ENCOUNTER — HEALTH MAINTENANCE LETTER (OUTPATIENT)
Age: 36
End: 2021-09-18

## 2022-01-08 ENCOUNTER — HEALTH MAINTENANCE LETTER (OUTPATIENT)
Age: 37
End: 2022-01-08

## 2022-11-19 ENCOUNTER — HEALTH MAINTENANCE LETTER (OUTPATIENT)
Age: 37
End: 2022-11-19

## 2023-04-09 ENCOUNTER — HEALTH MAINTENANCE LETTER (OUTPATIENT)
Age: 38
End: 2023-04-09

## 2024-09-10 ENCOUNTER — TRANSFERRED RECORDS (OUTPATIENT)
Dept: HEALTH INFORMATION MANAGEMENT | Facility: CLINIC | Age: 39
End: 2024-09-10

## 2024-10-16 ENCOUNTER — TRANSFERRED RECORDS (OUTPATIENT)
Dept: HEALTH INFORMATION MANAGEMENT | Facility: CLINIC | Age: 39
End: 2024-10-16
Payer: COMMERCIAL

## 2024-10-21 ENCOUNTER — MEDICAL CORRESPONDENCE (OUTPATIENT)
Dept: HEALTH INFORMATION MANAGEMENT | Facility: CLINIC | Age: 39
End: 2024-10-21
Payer: COMMERCIAL

## 2024-10-21 ENCOUNTER — TRANSFERRED RECORDS (OUTPATIENT)
Dept: HEALTH INFORMATION MANAGEMENT | Facility: CLINIC | Age: 39
End: 2024-10-21
Payer: COMMERCIAL

## 2024-10-22 ENCOUNTER — TRANSCRIBE ORDERS (OUTPATIENT)
Dept: OTHER | Age: 39
End: 2024-10-22

## 2024-10-22 DIAGNOSIS — M31.30 WEGENER'S GRANULOMATOSIS WITHOUT RENAL INVOLVEMENT (H): ICD-10-CM

## 2024-10-22 DIAGNOSIS — R05.3 CHRONIC COUGH: Primary | ICD-10-CM

## 2024-10-22 DIAGNOSIS — J45.909 ASTHMA: ICD-10-CM

## 2024-10-23 DIAGNOSIS — R05.3 CHRONIC COUGH: Primary | ICD-10-CM

## 2024-10-29 RX ORDER — ALBUTEROL SULFATE 0.83 MG/ML
2.5 SOLUTION RESPIRATORY (INHALATION) ONCE
Status: COMPLETED | OUTPATIENT
Start: 2024-10-31 | End: 2024-10-31

## 2024-10-31 ENCOUNTER — HOSPITAL ENCOUNTER (OUTPATIENT)
Dept: RESPIRATORY THERAPY | Facility: CLINIC | Age: 39
Discharge: HOME | End: 2024-10-31
Admitting: INTERNAL MEDICINE
Payer: COMMERCIAL

## 2024-10-31 DIAGNOSIS — R05.3 CHRONIC COUGH: ICD-10-CM

## 2024-10-31 DIAGNOSIS — R06.02 SOB (SHORTNESS OF BREATH): Primary | ICD-10-CM

## 2024-10-31 LAB — HGB BLD-MCNC: 13.3 G/DL (ref 11.7–15.7)

## 2024-10-31 PROCEDURE — 999N000157 HC STATISTIC RCP TIME EA 10 MIN

## 2024-10-31 PROCEDURE — 94726 PLETHYSMOGRAPHY LUNG VOLUMES: CPT

## 2024-10-31 PROCEDURE — 36415 COLL VENOUS BLD VENIPUNCTURE: CPT | Performed by: INTERNAL MEDICINE

## 2024-10-31 PROCEDURE — 94060 EVALUATION OF WHEEZING: CPT

## 2024-10-31 PROCEDURE — 94729 DIFFUSING CAPACITY: CPT

## 2024-10-31 PROCEDURE — 85018 HEMOGLOBIN: CPT | Performed by: INTERNAL MEDICINE

## 2024-10-31 PROCEDURE — 250N000009 HC RX 250: Performed by: INTERNAL MEDICINE

## 2024-10-31 RX ADMIN — ALBUTEROL SULFATE 2.5 MG: 2.5 SOLUTION RESPIRATORY (INHALATION) at 08:43

## 2024-10-31 NOTE — PROGRESS NOTES
RESPIRATORY CARE NOTE    Complete Pulmonary Function Test completed by patient.  Good patient effort and cooperation. Albuterol 2.5 mg neb given for bronchodilation.  The results of this test meet the ATS standards for acceptability and repeatability. PT returned to baseline and left in no distress.    Kaitlynn Rios, RT  10/31/2024

## 2024-11-01 LAB
DLCOCOR-%PRED-PRE: 110 %
DLCOCOR-PRE: 28 ML/MIN/MMHG
DLCOUNC-%PRED-PRE: 110 %
DLCOUNC-PRE: 27.91 ML/MIN/MMHG
DLCOUNC-PRED: 25.25 ML/MIN/MMHG
ERV-%PRED-PRE: 79 %
ERV-PRE: 1.2 L
ERV-PRED: 1.51 L
EXPTIME-PRE: 5.71 SEC
FEF2575-%PRED-POST: 124 %
FEF2575-%PRED-PRE: 101 %
FEF2575-POST: 4.25 L/SEC
FEF2575-PRE: 3.46 L/SEC
FEF2575-PRED: 3.41 L/SEC
FEFMAX-%PRED-PRE: 86 %
FEFMAX-PRE: 6.97 L/SEC
FEFMAX-PRED: 8.08 L/SEC
FEV1-%PRED-PRE: 102 %
FEV1-PRE: 3.56 L
FEV1FEV6-PRE: 80 %
FEV1FEV6-PRED: 84 %
FEV1FVC-PRE: 80 %
FEV1FVC-PRED: 82 %
FEV1SVC-PRE: 84 %
FEV1SVC-PRED: 81 %
FIFMAX-PRE: 4.32 L/SEC
FRCPLETH-%PRED-PRE: 102 %
FRCPLETH-PRE: 3.16 L
FRCPLETH-PRED: 3.08 L
FVC-%PRED-PRE: 105 %
FVC-PRE: 4.47 L
FVC-PRED: 4.25 L
IC-%PRED-PRE: 100 %
IC-PRE: 3.03 L
IC-PRED: 3.02 L
RVPLETH-%PRED-PRE: 101 %
RVPLETH-PRE: 1.96 L
RVPLETH-PRED: 1.93 L
TLCPLETH-%PRED-PRE: 101 %
TLCPLETH-PRE: 6.19 L
TLCPLETH-PRED: 6.11 L
VA-%PRED-PRE: 99 %
VA-PRE: 6.06 L
VC-%PRED-PRE: 99 %
VC-PRE: 4.23 L
VC-PRED: 4.25 L

## 2024-11-03 ENCOUNTER — HEALTH MAINTENANCE LETTER (OUTPATIENT)
Age: 39
End: 2024-11-03

## 2024-11-25 ENCOUNTER — OFFICE VISIT (OUTPATIENT)
Dept: PULMONOLOGY | Facility: CLINIC | Age: 39
End: 2024-11-25
Attending: FAMILY MEDICINE
Payer: COMMERCIAL

## 2024-11-25 VITALS
OXYGEN SATURATION: 97 % | DIASTOLIC BLOOD PRESSURE: 62 MMHG | HEART RATE: 86 BPM | SYSTOLIC BLOOD PRESSURE: 112 MMHG | BODY MASS INDEX: 31 KG/M2 | WEIGHT: 219 LBS

## 2024-11-25 DIAGNOSIS — R05.3 CHRONIC COUGH: ICD-10-CM

## 2024-11-25 DIAGNOSIS — M31.30 WEGENER'S GRANULOMATOSIS WITHOUT RENAL INVOLVEMENT (H): ICD-10-CM

## 2024-11-25 DIAGNOSIS — J45.30 MILD PERSISTENT ASTHMA WITHOUT COMPLICATION: Primary | ICD-10-CM

## 2024-11-25 PROCEDURE — 99204 OFFICE O/P NEW MOD 45 MIN: CPT | Mod: 25 | Performed by: INTERNAL MEDICINE

## 2024-11-25 PROCEDURE — 95012 NITRIC OXIDE EXP GAS DETER: CPT | Mod: 26 | Performed by: INTERNAL MEDICINE

## 2024-11-25 RX ORDER — AZATHIOPRINE 50 MG/1
50 TABLET ORAL 2 TIMES DAILY
COMMUNITY

## 2024-11-25 RX ORDER — BUDESONIDE AND FORMOTEROL FUMARATE DIHYDRATE 160; 4.5 UG/1; UG/1
2 AEROSOL RESPIRATORY (INHALATION) DAILY
Qty: 10.2 G | Refills: 12 | Status: SHIPPED | OUTPATIENT
Start: 2024-11-25

## 2024-11-25 RX ORDER — FAMOTIDINE 20 MG
5000 TABLET ORAL DAILY
COMMUNITY

## 2024-11-25 RX ORDER — CETIRIZINE HYDROCHLORIDE 10 MG/1
10 TABLET, CHEWABLE ORAL DAILY
COMMUNITY
Start: 2024-10-16

## 2024-11-25 RX ORDER — ALBUTEROL SULFATE 90 UG/1
2 INHALANT RESPIRATORY (INHALATION) EVERY 6 HOURS PRN
Qty: 18 G | Refills: 12 | Status: SHIPPED | OUTPATIENT
Start: 2024-11-25

## 2024-11-25 ASSESSMENT — ASTHMA QUESTIONNAIRES
QUESTION_3 LAST FOUR WEEKS HOW OFTEN DID YOUR ASTHMA SYMPTOMS (WHEEZING, COUGHING, SHORTNESS OF BREATH, CHEST TIGHTNESS OR PAIN) WAKE YOU UP AT NIGHT OR EARLIER THAN USUAL IN THE MORNING: NOT AT ALL
QUESTION_5 LAST FOUR WEEKS HOW WOULD YOU RATE YOUR ASTHMA CONTROL: COMPLETELY CONTROLLED
QUESTION_1 LAST FOUR WEEKS HOW MUCH OF THE TIME DID YOUR ASTHMA KEEP YOU FROM GETTING AS MUCH DONE AT WORK, SCHOOL OR AT HOME: NONE OF THE TIME
ACT_TOTALSCORE: 25
ACT_TOTALSCORE: 25
QUESTION_2 LAST FOUR WEEKS HOW OFTEN HAVE YOU HAD SHORTNESS OF BREATH: NOT AT ALL
QUESTION_4 LAST FOUR WEEKS HOW OFTEN HAVE YOU USED YOUR RESCUE INHALER OR NEBULIZER MEDICATION (SUCH AS ALBUTEROL): NOT AT ALL

## 2024-11-25 NOTE — PROGRESS NOTES
Niox result was:  19 ppb.    Anthony WAGNER CMA, M [facility]

## 2024-11-25 NOTE — PATIENT INSTRUCTIONS
Resume Symbicort 2 puffs daily   Albuterol HFA two puffs every 6 hours as needed  Cetrizine 10 mg daily   Call me back if you have any questions [phone] , nurse Gasper   Follow up in 6 months

## 2024-11-25 NOTE — PROGRESS NOTES
PULMONARY OUTPATIENT CONSULT NOTE        Assessment:      Mild persistent asthma  (+) outdoor allergies. No tobacco user.   Normal lung function test. FeNO 19  Mild air trapping in chest CT scan.  Resume ICS/LABA. Albuterol HFA as needed  Outdoor allergies  Cetirizine daily   GPA (Wegener's granulomatosis)  Diagnosed after frequent ear and sinus infection. (+) serology)  S/p RTX. Then AZA and methotrexate were added to treatment.   Currently on maintenance treatment with AZA   Obesity Body mass index is 31 kg/m .     Plan:      Resume Symbicort 2 puffs daily   Albuterol HFA two puffs every 6 hours as needed  Cetrizine 10 mg daily   Call me back if you have any questions [phone] , nurse Gasper   Follow up in 6 months         Henrry Leone  Pulmonary / Critical Care  November 25, 2024        CC:     Chief Complaint   Patient presents with    Consult     Cough and asthma       HPI:         Jerardo Diamond is a 39 year old female who presents for evaluation of cough.   Patient has history of granulomatosis with polyangiitis on methotrexate, AZA, outdoor allergies.   Patient was diagnosed with Wegener's granulomatosis after work up for frequent sinus and ear infections, had (+) serology. Received rituxan in the past, currently treated with methotrexate and AZA.     Patient reports dry cough lasted 8 weeks, patient was seen in Urgent Care on 9/10/24, CXR showed no infiltrates. Treated with steroid taper. Persistent cough, treated for augmentin. Inhaled corticosteroid was added on 9/30. Recurrence cough after finishing antibiotics, zpack was prescribed. Recurrent cough after finishing Abx. Patient was seen by PCP on 10/24/2024 for evaluation of persistent cough. Treated with augmentin. Patient had a chest CT scan , study showed minimal GGO at the bases, likely air trapping. She was referred to this clinic.   No history of asthma, reports outdoor allergies, takes cetirizine.   Family hx (+) kids with  allergies   No tobacco user, no pets at home.   Since last course of steroids and Abx at the end of October, cough has improved.  Denies wheezes or exertional dyspnea.   No fever, chills or night sweats.   No postnasal drip,  headaches, or sinus tenderness.   Stopped using ICS/LABA.   Denies chest pain, orthopnea, PND, swelling of LEs  Denies sleeping problems, refresh in AM  No acid reflux symptoms.        Past Medical History :     Past Medical History:   Diagnosis Date    Conductive hearing loss 17    Tinnitus 5/15/17?l          Medications:     Current Outpatient Medications   Medication Sig Dispense Refill    albuterol (PROAIR HFA/PROVENTIL HFA/VENTOLIN HFA) 108 (90 Base) MCG/ACT inhaler Inhale 2 puffs into the lungs every 6 hours as needed for shortness of breath, wheezing or cough. 18 g 12    azaTHIOprine (IMURAN) 50 MG tablet Take 50 mg by mouth 2 times daily.      budesonide-formoterol (SYMBICORT) 160-4.5 MCG/ACT Inhaler Inhale 2 puffs into the lungs daily. 10.2 g 12    cetirizine (ZYRTEC) 10 MG CHEW Take 10 mg by mouth daily.      NONFORMULARY Calcium magnesium and zinc with Vitamin 2 1 tablet per day      Vitamin D, Cholecalciferol, 25 MCG (1000 UT) CAPS Take 5,000 units/kg/day of lipase by mouth daily.      Cetirizine HCl (ZYRTEC ALLERGY PO) Take 10 mg by mouth      Ibuprofen (ADVIL PO) Take 600 mg by mouth       No current facility-administered medications for this visit.          Social History :     Social History     Socioeconomic History    Marital status:      Spouse name: Not on file    Number of children: Not on file    Years of education: Not on file    Highest education level: Not on file   Occupational History    Not on file   Tobacco Use    Smoking status: Former     Current packs/day: 0.00     Types: Cigarettes     Start date: 2003     Quit date: 2005     Years since quittin.9    Smokeless tobacco: Never   Vaping Use    Vaping status: Never Used   Substance and  Sexual Activity    Alcohol use: Yes    Drug use: No    Sexual activity: Yes     Partners: Male     Birth control/protection: Pull-out method   Other Topics Concern    Not on file   Social History Narrative    Not on file     Social Drivers of Health     Financial Resource Strain: Not on file   Food Insecurity: Not on file   Transportation Needs: Not on file   Physical Activity: Not on file   Stress: Not on file (11/9/2024)   Social Connections: Not on file   Interpersonal Safety: Not on file   Housing Stability: Not on file          Family History :     Family History   Problem Relation Age of Onset    Dementia Maternal Grandmother         alzheimers    Cancer Maternal Grandfather         Throat - Tonsil?    Cancer Paternal Grandmother         b-cell?    Hypertension Mother         mild        Review of Systems  A 12 point comprehensive review of systems was negative except as noted.        Objective:     /62   Pulse 86   Wt 99.3 kg (219 lb)   SpO2 97%   BMI 31.00 kg/m      Gen: awake, alert, no distress  HEENT: pink conjunctiva, moist mucosa, Mallampati II/IV  Neck: no thyromegaly, masses or JVD  Lungs: clear  CV: regular, no murmurs or gallops appreciated  Abdomen: soft, NT, BS wnl  Ext: no edema  Neuro: CN II-XII intact, non focal      Diagnostic tests:         PFTs:     PFT (10/31/2024)    FVC  4.47 L (105%)  FEV1  3.56 L (102%)  FEV1/FVC 80%  No significant post bronchodilator response  TLC  6.19 L (101%)  RV  1.96 L (101%)  DLCO  110%     IMAGES:     Chest CT scan (10/16/2024)  No infiltrates, no cavitary lesions  Discrete GGO at the bases related to mild air trapping.   No mediastinal adenopathy

## 2025-04-19 ENCOUNTER — HEALTH MAINTENANCE LETTER (OUTPATIENT)
Age: 40
End: 2025-04-19

## 2025-06-10 ENCOUNTER — OFFICE VISIT (OUTPATIENT)
Dept: PULMONOLOGY | Facility: CLINIC | Age: 40
End: 2025-06-10
Attending: INTERNAL MEDICINE
Payer: COMMERCIAL

## 2025-06-10 VITALS
DIASTOLIC BLOOD PRESSURE: 85 MMHG | SYSTOLIC BLOOD PRESSURE: 128 MMHG | HEART RATE: 80 BPM | WEIGHT: 225 LBS | BODY MASS INDEX: 31.85 KG/M2 | OXYGEN SATURATION: 98 %

## 2025-06-10 DIAGNOSIS — J45.30 MILD PERSISTENT ASTHMA WITHOUT COMPLICATION: ICD-10-CM

## 2025-06-10 PROCEDURE — G2211 COMPLEX E/M VISIT ADD ON: HCPCS | Performed by: INTERNAL MEDICINE

## 2025-06-10 PROCEDURE — 99213 OFFICE O/P EST LOW 20 MIN: CPT | Performed by: INTERNAL MEDICINE

## 2025-06-10 PROCEDURE — 3079F DIAST BP 80-89 MM HG: CPT | Performed by: INTERNAL MEDICINE

## 2025-06-10 PROCEDURE — 3074F SYST BP LT 130 MM HG: CPT | Performed by: INTERNAL MEDICINE

## 2025-06-10 ASSESSMENT — ASTHMA QUESTIONNAIRES
QUESTION_2 LAST FOUR WEEKS HOW OFTEN HAVE YOU HAD SHORTNESS OF BREATH: NOT AT ALL
QUESTION_1 LAST FOUR WEEKS HOW MUCH OF THE TIME DID YOUR ASTHMA KEEP YOU FROM GETTING AS MUCH DONE AT WORK, SCHOOL OR AT HOME: NONE OF THE TIME
QUESTION_5 LAST FOUR WEEKS HOW WOULD YOU RATE YOUR ASTHMA CONTROL: COMPLETELY CONTROLLED
QUESTION_3 LAST FOUR WEEKS HOW OFTEN DID YOUR ASTHMA SYMPTOMS (WHEEZING, COUGHING, SHORTNESS OF BREATH, CHEST TIGHTNESS OR PAIN) WAKE YOU UP AT NIGHT OR EARLIER THAN USUAL IN THE MORNING: NOT AT ALL
ACT_TOTALSCORE: 25
QUESTION_4 LAST FOUR WEEKS HOW OFTEN HAVE YOU USED YOUR RESCUE INHALER OR NEBULIZER MEDICATION (SUCH AS ALBUTEROL): NOT AT ALL

## 2025-06-10 NOTE — PATIENT INSTRUCTIONS
Albuterol HFA two puffs every 6 hours as needed  Cetrizine 10 mg daily   Call me if you have any questions [phone], nurse Gasper   Follow up in one year

## 2025-06-30 NOTE — PROGRESS NOTES
PULMONARY OUTPATIENT PROGRESS NOTE        Assessment:      Mild intermittent asthma  (+) outdoor allergies. No tobacco user.   Normal lung function test.   Mild air trapping in chest CT scan.  Albuterol HFA as needed. Cetirizine daily.   GPA (Wegener's granulomatosis)  Diagnosed after frequent ear and sinus infection. (+) serology)  S/p RTX. Then AZA and methotrexate were added to treatment.   Currently on maintenance treatment with AZA   Obesity Body mass index is 31 kg/m .     Plan:      Albuterol HFA two puffs every 6 hours as needed  Cetrizine 10 mg daily   Call me if you have any questions [phone], nurse Gasper   Follow up in one year         Henrry Leone  Pulmonary / Critical Care  Candace 10, 2025        CC:     Chief Complaint   Patient presents with    Follow Up     Asthma       HPI:         Jerardo Diamond is a 39 year old female who presents for follow up.   Patient has history of granulomatosis with polyangiitis on methotrexate, AZA, asthma.   Patient was diagnosed with Wegener's granulomatosis after work up for frequent sinus and ear infections, had (+) serology. Received rituxan in the past, currently treated with methotrexate and AZA.     Patient was previously seen for chronic cough.   Chest CT scan , study showed minimal GGO at the bases, likely air trapping. PFTs were within normal limites.   (+) outdoor allergies, family hx (+) asthma. No tobacco user, no pets at home.   Previously started on ICS/LABA, cough has improved and patient stopped using inhaler.   Denies using rescue inhaler. Symptoms are under control with cetirizine daily.  Denies wheezes or exertional dyspnea.   No fever, chills or night sweats.   No postnasal drip,  headaches, or sinus tenderness.    Denies chest pain, orthopnea, PND, swelling of LEs  Denies sleeping problems, refresh in AM  No acid reflux symptoms.        Past Medical History :     Past Medical History:   Diagnosis Date    Conductive hearing loss  15    Tinnitus 5/15/17?l          Medications:     Current Outpatient Medications   Medication Sig Dispense Refill    azaTHIOprine (IMURAN) 50 MG tablet Take 50 mg by mouth 2 times daily.      cetirizine (ZYRTEC) 10 MG CHEW Take 10 mg by mouth daily.      Ibuprofen (ADVIL PO) Take 600 mg by mouth      NONFORMULARY Calcium magnesium and zinc with Vitamin 2 1 tablet per day      Vitamin D, Cholecalciferol, 25 MCG (1000 UT) CAPS Take 5,000 units/kg/day of lipase by mouth daily.      albuterol (PROAIR HFA/PROVENTIL HFA/VENTOLIN HFA) 108 (90 Base) MCG/ACT inhaler Inhale 2 puffs into the lungs every 6 hours as needed for shortness of breath, wheezing or cough. (Patient not taking: Reported on 6/10/2025) 18 g 12    budesonide-formoterol (SYMBICORT) 160-4.5 MCG/ACT Inhaler Inhale 2 puffs into the lungs daily. (Patient not taking: Reported on 6/10/2025) 10.2 g 12    Cetirizine HCl (ZYRTEC ALLERGY PO) Take 10 mg by mouth       No current facility-administered medications for this visit.          Social History :     Social History     Socioeconomic History    Marital status:      Spouse name: Not on file    Number of children: Not on file    Years of education: Not on file    Highest education level: Not on file   Occupational History    Not on file   Tobacco Use    Smoking status: Former     Current packs/day: 0.00     Types: Cigarettes     Start date: 2003     Quit date: 2005     Years since quittin.5    Smokeless tobacco: Never   Vaping Use    Vaping status: Never Used   Substance and Sexual Activity    Alcohol use: Yes    Drug use: No    Sexual activity: Yes     Partners: Male     Birth control/protection: Pull-out method   Other Topics Concern    Not on file   Social History Narrative    Not on file     Social Drivers of Health     Financial Resource Strain: Not on file   Food Insecurity: Not on file   Transportation Needs: Not on file   Physical Activity: Not on file   Stress: Not on file  (11/9/2024)   Social Connections: Not on file   Interpersonal Safety: Not on file   Housing Stability: Not on file          Family History :     Family History   Problem Relation Age of Onset    Dementia Maternal Grandmother         alzheimers    Cancer Maternal Grandfather         Throat - Tonsil?    Cancer Paternal Grandmother         b-cell?    Hypertension Mother         mild        Review of Systems  A 12 point comprehensive review of systems was negative except as noted.        Objective:     /85   Pulse 80   Wt 102.1 kg (225 lb)   SpO2 98%   BMI 31.85 kg/m      Gen: awake, alert, no distress  HEENT: pink conjunctiva, moist mucosa, Mallampati II/IV  Neck: no thyromegaly, masses or JVD  Lungs: clear  CV: regular, no murmurs or gallops appreciated  Abdomen: soft, NT, BS wnl  Ext: no edema  Neuro: CN II-XII intact, non focal      Diagnostic tests:         PFTs:     PFT (10/31/2024)    FVC  4.47 L (105%)  FEV1  3.56 L (102%)  FEV1/FVC 80%  No significant post bronchodilator response  TLC  6.19 L (101%)  RV  1.96 L (101%)  DLCO  110%     IMAGES:     Chest CT scan (10/16/2024)  No infiltrates, no cavitary lesions  Discrete GGO at the bases related to mild air trapping.   No mediastinal adenopathy